# Patient Record
(demographics unavailable — no encounter records)

---

## 2025-07-28 NOTE — DISCUSSION/SUMMARY
[Normal Growth] : growth [Normal Development] : developmental [Normal Sleep Pattern] : sleep [No Medications] : ~He/She~ is not on any medications [No Elimination Concerns] : elimination [Continue Regimen] : feeding [No Skin Concerns] : skin [None] : no known medical problems [Anticipatory Guidance Given] : Anticipatory guidance addressed as per the history of present illness section [ Transition] :  transition [ Care] :  care [Nutritional Adequacy] : nutritional adequacy [Parental Well-Being] : parental well-being [Safety] : safety [Hepatitis B In Hospital] : Hepatitis B not administered while in the hospital [No Vaccines] : no vaccines needed [Parent/Guardian] : Parent/Guardian [FreeTextEntry6] : mother defers hepatitis b vaccine to next visit, risks and benefits were explained. [FreeTextEntry1] : 5 day old female born FT via C/S for presenting for HCM. Maternal prenatal labs negative. Growth and development normal. Loss from birth weight 0.39%, within appropriate range. PE unremarkable. Maternal depression screen passed. CCHD and hearing screens passed. NBS screen pending.   PLAN HCM - Routine  care & anticipatory guidance given - Continue ad shilpa feeds - Follow up NBS screen - RTC 10 days for weight check and prn, mother would like infant to receive hepatitis B vaccine then - RTC for 1 month HCM and prn - Discussed STRICT precautions for seeking immediate medical attention including but not limited to fever of 100.4F or more, yellowing or increased yellowing of skin or eyes, redness, discharge or foul odor from umbilical stump, poor feeding, lethargy or decreased responsiveness, fast or labored breathing, less than 5 wet diapers daily, rash or any other concerning sign or symptom.  TRANSIENT TACHYPNEA OF : resolved. normal respiratory rate and effort on exam  Caretaker expressed understanding of the plan and agrees. All questions were answered.

## 2025-07-28 NOTE — DEVELOPMENTAL MILESTONES
[None] : none [Makes brief eye contact] : makes brief eye contact [Cries with discomfort] : cries with discomfort [Calms to adult voice] : calms to adult voice [Reflexively moves arms and legs] : reflexively moves arms and legs [Holds fingers closed] : holds fingers closed [Grasps reflexively] : grasp reflexively [Passed] : passed

## 2025-07-28 NOTE — PHYSICAL EXAM
[Alert] : alert [Consolable] : consolable [Normocephalic] : normocephalic [Flat Open Anterior [place]] : flat open anterior fontanelle [PERRL] : PERRL [Red Reflex Bilateral] : red reflex bilateral [Normally Placed Ears] : normally placed ears [Auricles Well Formed] : auricles well formed [Clear Tympanic membranes] : clear tympanic membranes [Light reflex present] : light reflex present [Bony structures visible] : bony structures visible [Patent Auditory Canal] : patent auditory canal [Nares Patent] : nares patent [Palate Intact] : palate intact [Uvula Midline] : uvula midline [Supple, full passive range of motion] : supple, full passive range of motion [Symmetric Chest Rise] : symmetric chest rise [Clear to Auscultation Bilaterally] : clear to auscultation bilaterally [Regular Rate and Rhythm] : regular rate and rhythm [S1, S2 present] : S1, S2 present [+2 Femoral Pulses] : +2 femoral pulses [Soft] : soft [Bowel Sounds] : bowel sounds present [Umbilical Stump Dry, Clean, Intact] : umbilical stump dry, clean, intact [Normal external genitalia] : normal external genitalia [Patent Vagina] : patent vagina [Patent] : patent [Normally Placed] : normally placed [No Abnormal Lymph Nodes Palpated] : no abnormal lymph nodes palpated [Symmetric Flexed Extremities] : symmetric flexed extremities [Startle Reflex] : startle reflex present [Suck Reflex] : suck reflex present [Rooting] : rooting reflex present [Palmar Grasp] : palmar grasp present [Plantar Grasp] : plantar reflex present [Symmetric [place]] : symmetric [place] [Acute Distress] : no acute distress [Icteric sclera] : nonicteric sclera [Discharge] : no discharge [Palpable Masses] : no palpable masses [Murmurs] : no murmurs [Tender] : nontender [Distended] : not distended [Hepatomegaly] : no hepatomegaly [Splenomegaly] : no splenomegaly [Clitoromegaly] : no clitoromegaly [Larsen-Ortolani] : negative Larsen-Ortolani [Spinal Dimple] : no spinal dimple [Tuft of Hair] : no tuft of hair [Jaundice] : not jaundice [Dermal Melanocytosis] : Dermal Melanocytosis [de-identified] : Nevus simplex on back of neck and upper eyelids bilaterally

## 2025-07-28 NOTE — HISTORY OF PRESENT ILLNESS
[Formula ___ oz/feed] : [unfilled] oz of formula per feed [Formula ___ oz in 24hrs] : [unfilled] oz of formula in 24 hours [Hours between feeds ___] : Child is fed every [unfilled] hours [___ Feeding per 24 hrs] : a  total of [unfilled] feedings in 24 hours [Normal] : Normal [Frequency of stools: ___] : Frequency of stools: [unfilled]  stools [per day] : per day. [Seedy] : seedy [Loose] : loose consistency [In Bassinet/Crib] : sleeps in bassinet/crib [On back] : sleeps on back [Pacifier] : Uses pacifier [No] : No cigarette smoke exposure [Water heater temperature set at <120 degrees F] : Water heater temperature set at <120 degrees F [Rear facing car seat in back seat] : Rear facing car seat in back seat [Carbon Monoxide Detectors] : Carbon monoxide detectors at home [Smoke Detectors] : Smoke detectors at home. [NO] : No [Born at ___ Wks Gestation] : The patient was born at [unfilled] weeks gestation [C/S] : via  section [[facility]] : [facility] [(1) _____] : [unfilled] [(5) _____] : [unfilled] [Other: ____] : [unfilled] [BW: _____] : weight of [unfilled] [Length: _____] : length of [unfilled] [HC: _____] : head circumference of [unfilled] [Time of Birth: _____] : Time of birth was [unfilled] [Age: ___] : [unfilled] year old mother [G: ___] : G [unfilled] [P: ___] : P [unfilled] [Significant Hx: ____] : The mother's  medical history is significant for [unfilled] [GBS] : GBS positive [Rubella (Immune)] : Rubella immune [TcB: _____] : Transcutaneous Bilirubin [unfilled] mg/dL [___ voids per day] : [unfilled] voids per day [Green/brown] : green/brown [Yellow] : yellow [HepBsAG] : HepBsAg negative [HIV] : HIV negative [VDRL/RPR (Reactive)] : VDRL/RPR nonreactive [] : negative [FreeTextEntry9] : O+ [Vitamins ___] : Patient takes no vitamins [Co-sleeping] : no co-sleeping [Loose bedding, pillow, toys, and/or bumpers in crib] : no loose bedding, pillow, toys, and/or bumpers in crib [Exposure to electronic nicotine delivery system] : No exposure to electronic nicotine delivery system [Hepatitis B Vaccine Given] : Hepatitis B vaccine not given [FreeTextEntry7] : 5 day old girl ex  37weeks born to a mom with PE and high BM with hx of 2 day stay in NICU for resp distress requiring respiratory support present for [place] visit.  [de-identified] : jefferson mata [FreeTextEntry8] : green [de-identified] : dexter [de-identified] : mother would like to get hepatitis b vaccine at return visit [FreeTextEntry1] : SDOH Screening Questionnaire  SDOH (Social Determinants of Health) Questionnaire: 1. Housing: Do you worry that in the upcoming months, your family, or child, may not have a safe or stable place to live? no 2. Food security: Within the last 12 months, did the food you bought not last and you did not have money to buy more? no 3. Community: Do you need help getting public benefits like food stamps or WIC? no 4. Transportation: Does your child have chronic medical condition and therefore struggle with transportation to attend medical appointments? no 5. Healthcare Access: Do you need help getting health or dental insurance? no Result: Negative Screen. No further intervention needed.  5 day old female born C/S presenting for  care and for hospital discharge follow up. Admit date: 25 Discharge date 25 Admit diagnosis: respiratory distress  Hospital course: Infant was cared for in NICU/High risk for 2 days and was then downgraded to WBN.  RESP: CXR was consistent with TTN Infant was placed on CPAP PEEP 6, and gradually weaned to high flow and then room air on DOL 1. Maintained saturations on room air without signs of respiratory distress.  CARDIO: Hemodynamically stable.  FEN/GI: Started on OG tube feedings of Gentlease (parent request) while on CPAP and then transitioned to PO ad shilpa feeding on DOl 1. Voiding and stooling appropriately.  HEME: Mother's blood type O+. Baby's blood type O+, Esther negative. Bilirubin monitored per protocol. TcB was 2.1, PT 11.2 at 21HOL and 6.5 @47HOL, PT 15.2.  ID: Observed for temperature instability, and was weaned to open crib on DOL 1 and remained normothermic.   Hospital course: Infant was cared for in NICU/High risk for 2 days and was then downgraded to WBN.  RESP: CXR was consistent with TTN Infant was placed on CPAP PEEP 6, and gradually weaned to high flow and then room air on DOL 1. Maintained saturations on room air without signs of respiratory distress.  CARDIO: Hemodynamically stable.  FEN/GI: Started on OG tube feedings of Gentlease (parent request) while on CPAP and then transitioned to PO ad shilpa feeding on DOl 1. Voiding and stooling appropriately.  HEME: Mother's blood type O+. Baby's blood type O+, Esther negative. Bilirubin monitored per protocol. TcB was 2.1, PT 11.2 at 21HOL and 6.5 @47HOL, PT 15.2.  ID: Observed for temperature instability, and was weaned to open crib on DOL 1 and remained normothermic.  NEURO: no issues  Infant is stable and cleared for discharge. Feeding Plan: ad shilpa feeds